# Patient Record
Sex: FEMALE | Race: WHITE | NOT HISPANIC OR LATINO | Employment: OTHER | URBAN - NONMETROPOLITAN AREA
[De-identification: names, ages, dates, MRNs, and addresses within clinical notes are randomized per-mention and may not be internally consistent; named-entity substitution may affect disease eponyms.]

---

## 2024-01-12 PROBLEM — R94.31 ABNORMAL HOLTER MONITOR FINDING: Status: ACTIVE | Noted: 2024-01-12

## 2024-01-12 PROBLEM — R94.30 ABNORMAL RESULTS OF CARDIOVASCULAR FUNCTION STUDIES: Status: ACTIVE | Noted: 2024-01-12

## 2024-01-12 PROBLEM — I49.3 PVC (PREMATURE VENTRICULAR CONTRACTION): Status: ACTIVE | Noted: 2024-01-12

## 2024-01-12 PROBLEM — I10 HYPERTENSION, ESSENTIAL, BENIGN: Status: ACTIVE | Noted: 2024-01-12

## 2024-01-12 PROBLEM — I51.89 FAMILIAL HEART DISEASE: Status: ACTIVE | Noted: 2024-01-12

## 2024-01-12 PROBLEM — F17.200 CURRENT EVERY DAY SMOKER: Status: ACTIVE | Noted: 2024-01-12

## 2024-01-12 RX ORDER — BISMUTH SUBSALICYLATE 262 MG
1 TABLET,CHEWABLE ORAL DAILY
COMMUNITY
End: 2024-02-27 | Stop reason: ALTCHOICE

## 2024-01-12 RX ORDER — AMLODIPINE AND BENAZEPRIL HYDROCHLORIDE 5; 20 MG/1; MG/1
CAPSULE ORAL DAILY
COMMUNITY
Start: 2022-08-18

## 2024-01-12 RX ORDER — NICOTINE 7MG/24HR
1 PATCH, TRANSDERMAL 24 HOURS TRANSDERMAL EVERY 24 HOURS
COMMUNITY
End: 2024-02-27 | Stop reason: ALTCHOICE

## 2024-02-27 ENCOUNTER — OFFICE VISIT (OUTPATIENT)
Dept: CARDIOLOGY | Facility: CLINIC | Age: 54
End: 2024-02-27
Payer: COMMERCIAL

## 2024-02-27 VITALS
WEIGHT: 204 LBS | HEART RATE: 84 BPM | SYSTOLIC BLOOD PRESSURE: 128 MMHG | DIASTOLIC BLOOD PRESSURE: 72 MMHG | HEIGHT: 62 IN | BODY MASS INDEX: 37.54 KG/M2

## 2024-02-27 DIAGNOSIS — I51.89 FAMILIAL HEART DISEASE: ICD-10-CM

## 2024-02-27 DIAGNOSIS — E66.9 CLASS 2 OBESITY: ICD-10-CM

## 2024-02-27 DIAGNOSIS — I49.3 PVC (PREMATURE VENTRICULAR CONTRACTION): ICD-10-CM

## 2024-02-27 DIAGNOSIS — I10 HYPERTENSION, ESSENTIAL, BENIGN: Primary | ICD-10-CM

## 2024-02-27 DIAGNOSIS — Z93.3 STATUS POST COLOSTOMY (MULTI): ICD-10-CM

## 2024-02-27 DIAGNOSIS — F17.200 CURRENT EVERY DAY SMOKER: ICD-10-CM

## 2024-02-27 DIAGNOSIS — R91.1 PULMONARY NODULE: ICD-10-CM

## 2024-02-27 PROCEDURE — 99214 OFFICE O/P EST MOD 30 MIN: CPT | Performed by: INTERNAL MEDICINE

## 2024-02-27 PROCEDURE — 3074F SYST BP LT 130 MM HG: CPT | Performed by: INTERNAL MEDICINE

## 2024-02-27 PROCEDURE — 3078F DIAST BP <80 MM HG: CPT | Performed by: INTERNAL MEDICINE

## 2024-02-27 RX ORDER — METOPROLOL SUCCINATE 25 MG/1
25 TABLET, EXTENDED RELEASE ORAL DAILY
Qty: 90 TABLET | Refills: 3 | Status: SHIPPED | OUTPATIENT
Start: 2024-02-27 | End: 2025-02-26

## 2024-02-27 NOTE — PATIENT INSTRUCTIONS
BMI was above normal measurement. Current weight: 92.5 kg (204 lb)  Weight change since last visit (-) denotes wt loss -3 lbs   Weight loss needed to achieve BMI 25: 67.6 Lbs  Weight loss needed to achieve BMI 30: 40.3 Lbs  Provided instructions on dietary changes  Provided instructions on exercise  Advised to Increase physical activity.    Please bring all medicines, vitamins, and herbal supplements with you when you come to the office.    Prescriptions will not be filled unless you are compliant with your follow up appointments or have a follow up appointment scheduled as per instruction of your physician. Refills should be requested at the time of your visit.

## 2024-08-19 DIAGNOSIS — I10 HYPERTENSION, ESSENTIAL, BENIGN: ICD-10-CM

## 2024-08-20 RX ORDER — AMLODIPINE AND BENAZEPRIL HYDROCHLORIDE 5; 20 MG/1; MG/1
1 CAPSULE ORAL DAILY
Qty: 90 CAPSULE | Refills: 3 | Status: SHIPPED | OUTPATIENT
Start: 2024-08-20 | End: 2025-08-20

## 2025-02-27 ENCOUNTER — APPOINTMENT (OUTPATIENT)
Dept: CARDIOLOGY | Facility: CLINIC | Age: 55
End: 2025-02-27
Payer: COMMERCIAL

## 2025-02-27 VITALS
BODY MASS INDEX: 38.34 KG/M2 | HEIGHT: 63 IN | HEART RATE: 84 BPM | DIASTOLIC BLOOD PRESSURE: 84 MMHG | WEIGHT: 216.4 LBS | SYSTOLIC BLOOD PRESSURE: 132 MMHG

## 2025-02-27 DIAGNOSIS — I10 HYPERTENSION, ESSENTIAL, BENIGN: Primary | ICD-10-CM

## 2025-02-27 DIAGNOSIS — I49.3 PVC (PREMATURE VENTRICULAR CONTRACTION): ICD-10-CM

## 2025-02-27 DIAGNOSIS — Z93.3 STATUS POST COLOSTOMY (MULTI): ICD-10-CM

## 2025-02-27 DIAGNOSIS — R91.1 PULMONARY NODULE: ICD-10-CM

## 2025-02-27 DIAGNOSIS — F17.200 CURRENT EVERY DAY SMOKER: ICD-10-CM

## 2025-02-27 DIAGNOSIS — E66.812 CLASS 2 OBESITY: ICD-10-CM

## 2025-02-27 PROCEDURE — 99214 OFFICE O/P EST MOD 30 MIN: CPT | Performed by: INTERNAL MEDICINE

## 2025-02-27 PROCEDURE — 4004F PT TOBACCO SCREEN RCVD TLK: CPT | Performed by: INTERNAL MEDICINE

## 2025-02-27 PROCEDURE — 3075F SYST BP GE 130 - 139MM HG: CPT | Performed by: INTERNAL MEDICINE

## 2025-02-27 PROCEDURE — 3008F BODY MASS INDEX DOCD: CPT | Performed by: INTERNAL MEDICINE

## 2025-02-27 PROCEDURE — 3079F DIAST BP 80-89 MM HG: CPT | Performed by: INTERNAL MEDICINE

## 2025-02-27 RX ORDER — METOPROLOL SUCCINATE 25 MG/1
25 TABLET, EXTENDED RELEASE ORAL DAILY
Qty: 90 TABLET | Refills: 3 | Status: SHIPPED | OUTPATIENT
Start: 2025-02-27 | End: 2026-02-27

## 2025-02-27 NOTE — PROGRESS NOTES
"Anmol Jennings is a 54 y.o. female       Chief Complaint    Annual Exam          HPI   Patient is in the office for follow-up for the problems noted below.  Since her last visit last year she has had no cardiac events whatsoever denies any palpitations or chest pain no dyspnea orthopnea PND or lower extremity edema.  They are contemplating closing the colostomy that she has.  She has not had any recent lab.  Blood pressure is under control.  She continues to smoke half a pack of cigarettes per day.  Her weight is 10 pounds above where it was last time which was brought to her attention.    Assessment/recommendations:     1-essential hypertension that is well controlled on Lotrel which has been well-tolerated.  Lab data have been followed.  2-active tobacco abuse, patient was counseled on tobacco cessation, she has failed nicotine patches in the past.  3-class II obesity, encouraged patient to lose weight with diet and exercise  4-PVCs causing palpitations and inconsequential with structurally normal heart and normal cardiac catheterization 2016 and negative EP evaluation, currently on metoprolol succinate which has been well-tolerated.  5-status post colostomy for diverticulitis   6-history of a spot on her chest x-ray a year ago with no follow-up.  Given her history of tobacco abuse a follow-up chest x-ray was recommended and I ordered it  Review of Systems   All other systems reviewed and are negative.           Vitals:    02/27/25 1129   BP: 132/84   BP Location: Left arm   Patient Position: Sitting   Pulse: 84   Weight: 98.2 kg (216 lb 6.4 oz)   Height: 1.588 m (5' 2.5\")        Objective   Physical Exam  Constitutional:       Appearance: Normal appearance.   HENT:      Nose: Nose normal.   Neck:      Vascular: No carotid bruit.   Cardiovascular:      Rate and Rhythm: Normal rate.      Pulses: Normal pulses.      Heart sounds: Normal heart sounds.   Pulmonary:      Effort: Pulmonary effort is " normal.   Abdominal:      General: Bowel sounds are normal.      Palpations: Abdomen is soft.   Musculoskeletal:         General: Normal range of motion.      Cervical back: Normal range of motion.      Right lower leg: No edema.      Left lower leg: No edema.   Skin:     General: Skin is warm and dry.   Neurological:      General: No focal deficit present.      Mental Status: She is alert.   Psychiatric:         Mood and Affect: Mood normal.         Behavior: Behavior normal.         Thought Content: Thought content normal.         Judgment: Judgment normal.         Allergies  Bupropion hcl and Penicillins     Current Medications    Current Outpatient Medications:     amLODIPine-benazepriL (Lotrel) 5-20 mg capsule, Take 1 capsule by mouth once daily., Disp: 90 capsule, Rfl: 3    metoprolol succinate XL (Toprol-XL) 25 mg 24 hr tablet, Take 1 tablet (25 mg) by mouth once daily. Do not crush or chew., Disp: 90 tablet, Rfl: 3                     Assessment/Plan   1. Hypertension, essential, benign  Follow Up In Cardiology    Basic Metabolic Panel    Basic Metabolic Panel      2. PVC (premature ventricular contraction)  metoprolol succinate XL (Toprol-XL) 25 mg 24 hr tablet    Basic Metabolic Panel    Basic Metabolic Panel      3. Pulmonary nodule  Follow Up In Cardiology    XR chest 2 views      4. Current every day smoker        5. BMI 38.0-38.9,adult                 Scribe Attestation  By signing my name below, Karin ROY LPN, Scribe   attest that this documentation has been prepared under the direction and in the presence of Jessica Campbell MD.     Provider Attestation - Scribe documentation    All medical record entries made by the Scribe were at my direction and personally dictated by me. I have reviewed the chart and agree that the record accurately reflects my personal performance of the history, physical exam, discussion and plan.

## 2025-02-27 NOTE — PATIENT INSTRUCTIONS
Please bring all medicines, vitamins, and herbal supplements with you when you come to the office.    Prescriptions will not be filled unless you are compliant with your follow up appointments or have a follow up appointment scheduled as per instruction of your physician. Refills should be requested at the time of your visit.     BMI was above normal measurement. Current weight: 98.2 kg (216 lb 6.4 oz)  Weight change since last visit (-) denotes wt loss 12.4 lbs   Weight loss needed to achieve BMI 25: 77.8 Lbs  Weight loss needed to achieve BMI 30: 50.1 Lbs  Provided instructions on dietary changes  .  Chest x ray  Follow up

## 2025-03-03 LAB
NON-UH HIE ANION GAP: 11.7 MEQ/L (ref 6–15)
NON-UH HIE BLOOD UREA NITROGEN: 19 MG/DL (ref 7–25)
NON-UH HIE CALCIUM: 10 MG/DL (ref 8.6–10.3)
NON-UH HIE CARBON DIOXIDE: 29.6 MMOL/L (ref 21–31)
NON-UH HIE CHLORIDE: 102 MMOL/L (ref 98–107)
NON-UH HIE CREATININE: 0.93 MG/DL (ref 0.6–1.2)
NON-UH HIE ESTIMATED GFR: >60 ML/MIN
NON-UH HIE GLUCOSE: 95 MG/DL (ref 70–100)
NON-UH HIE POTASSIUM: 4.3 MMOL/L (ref 3.5–5.1)
NON-UH HIE SODIUM: 139 MMOL/L (ref 136–145)

## 2025-03-10 ENCOUNTER — TELEPHONE (OUTPATIENT)
Dept: CARDIOLOGY | Facility: CLINIC | Age: 55
End: 2025-03-10
Payer: COMMERCIAL

## 2025-03-10 DIAGNOSIS — R93.89 ABNORMAL CHEST X-RAY: ICD-10-CM

## 2025-03-10 DIAGNOSIS — R91.1 LUNG NODULE: ICD-10-CM

## 2025-03-10 NOTE — TELEPHONE ENCOUNTER
See chest xray result    ----- Message from Jessica Campbell sent at 3/3/2025  4:58 PM EST -----  Since the radiologist recommended CT scan for more clarification of a finding on chest x-ray please do an enhanced CT of the chest  ----- Message -----  From: Mirna Rivera  Sent: 3/3/2025   3:52 PM EST  To: Jessica Campbell MD

## 2025-03-12 NOTE — TELEPHONE ENCOUNTER
Patient contacted. Patient states that she had ct of abdomen and pelvis last week for Dr. Rae related to her ostomy.  Testing is in care everywhere and mentions lung view.     To Dr. Jessica Campbell MD to inquire if she should still proceed with CT of chest at this time  ( and to verify CT vs CTA of chest)

## 2025-03-13 NOTE — TELEPHONE ENCOUNTER
Order prepped and sent to Dr. Jessica Campbell MD for signature     Left detailed message with patient informing that Dr. Jessica Campbell MD would like to proceed with ct of chest.     Task sent to  to call and arrange once order signed.

## 2025-03-17 ENCOUNTER — TELEPHONE (OUTPATIENT)
Dept: CARDIOLOGY | Facility: CLINIC | Age: 55
End: 2025-03-17
Payer: COMMERCIAL

## 2025-03-17 NOTE — TELEPHONE ENCOUNTER
CT Chest w/o IV Contrast 22743 DX: R93.89/R91.1 at [facility] has been approved and is valid from 3/17/25-4/16/25-Auth#[id number]. Spoke to Danita CAIN at [facility].

## 2025-07-23 DIAGNOSIS — I10 HYPERTENSION, ESSENTIAL, BENIGN: ICD-10-CM

## 2025-07-23 RX ORDER — AMLODIPINE AND BENAZEPRIL HYDROCHLORIDE 5; 20 MG/1; MG/1
1 CAPSULE ORAL DAILY
Qty: 90 CAPSULE | Refills: 3 | Status: SHIPPED | OUTPATIENT
Start: 2025-07-23 | End: 2026-07-23

## 2026-02-27 ENCOUNTER — APPOINTMENT (OUTPATIENT)
Dept: CARDIOLOGY | Facility: CLINIC | Age: 56
End: 2026-02-27
Payer: COMMERCIAL